# Patient Record
(demographics unavailable — no encounter records)

---

## 2024-11-13 NOTE — HEALTH RISK ASSESSMENT
[0] : 2) Feeling down, depressed, or hopeless: Not at all (0) [PHQ-2 Negative - No further assessment needed] : PHQ-2 Negative - No further assessment needed [College] : College [Never] : Never [Excellent] : ~his/her~  mood as  excellent [No] : In the past 12 months have you used drugs other than those required for medical reasons? No [FreeTextEntry1] : none  [de-identified] : none  [[id number]] : 0 [Change in mental status noted] : No change in mental status noted

## 2024-11-13 NOTE — PLAN
[FreeTextEntry1] : 21M NPA  no reported medications  PHQ-9 negative  routine labs  refer to pulm for PFTS due to childhood asthma and applying to [employer]

## 2024-11-13 NOTE — PHYSICAL EXAM
[No Acute Distress] : no acute distress [Normal Sclera/Conjunctiva] : normal sclera/conjunctiva [PERRL] : pupils equal round and reactive to light [Normal Outer Ear/Nose] : the outer ears and nose were normal in appearance [No JVD] : no jugular venous distention [No Lymphadenopathy] : no lymphadenopathy [Supple] : supple [Thyroid Normal, No Nodules] : the thyroid was normal and there were no nodules present [No Respiratory Distress] : no respiratory distress  [No Accessory Muscle Use] : no accessory muscle use [Clear to Auscultation] : lungs were clear to auscultation bilaterally [Normal Rate] : normal rate  [Regular Rhythm] : with a regular rhythm [Normal S1, S2] : normal S1 and S2 [No Murmur] : no murmur heard [Pedal Pulses Present] : the pedal pulses are present [No Extremity Clubbing/Cyanosis] : no extremity clubbing/cyanosis [Normal Gait] : normal gait [Normal Affect] : the affect was normal [Alert and Oriented x3] : oriented to person, place, and time

## 2024-11-13 NOTE — HISTORY OF PRESENT ILLNESS
[FreeTextEntry1] : pt presents to establish care as new patient  [de-identified] : 21M is here to establish care. PHMX asthma. Denies changes in vision, dizziness, chest pain, wheezing, depression and anxiety.

## 2024-12-12 NOTE — PROCEDURE
[FreeTextEntry1] : Pulmonary function test performed 12/12/2024 no obstructive or restrictive lung disease.

## 2024-12-12 NOTE — REASON FOR VISIT
[Other: _____] : [unfilled] [TextEntry] : Patient is here for medical clearance.  Patient is joining the [employer].  He had a PFT today, he has no pulmonary complaints.

## 2024-12-12 NOTE — DISCUSSION/SUMMARY
[FreeTextEntry1] : Mr. Ramos wants to join the [employer].  The patient has a history of childhood asthma his last episode in 2021.  At the time of bronchitis.  The patient has not had any respiratory difficulty since.  He is feeling well and is on no maintenance therapy nor albuterol as needed.  His exam is normal as is his pulmonary function test.  There is no evidence of active bronchospasm at this time nor any need for maintenance therapy.  There is no pulmonary contraindication to his activities. The patient understands and agrees with plan of care. Today's office visit encompassed 46 minutes. I conducted an extensive history,physical exam and reviewed diagnosis and treatment options including diagnostic tests,radiology studies including cat scans and the use of prescription medication.

## 2024-12-12 NOTE — HISTORY OF PRESENT ILLNESS
[Never] : never [TextBox_4] : 21 male no hx tobacco  Presents today for pulmonary eval for [employer] and hx asthma Dx asthma 2007  hosp many times last 2017  no ED visit  no steroids since  last event 2021 at time of bronchitis  and rx albuterol and did not use today feels good no sob no cough no wheeze no chest pain no tightness full activity  precipitating factors ++cold air  lives dog no allergy no cat no bird